# Patient Record
Sex: MALE | Race: WHITE | Employment: OTHER | URBAN - NONMETROPOLITAN AREA
[De-identification: names, ages, dates, MRNs, and addresses within clinical notes are randomized per-mention and may not be internally consistent; named-entity substitution may affect disease eponyms.]

---

## 2019-10-08 ENCOUNTER — LABORATORY ENCOUNTER (OUTPATIENT)
Dept: LAB | Age: 59
End: 2019-10-08
Attending: FAMILY MEDICINE
Payer: COMMERCIAL

## 2019-10-08 ENCOUNTER — OFFICE VISIT (OUTPATIENT)
Dept: FAMILY MEDICINE CLINIC | Facility: CLINIC | Age: 59
End: 2019-10-08
Payer: COMMERCIAL

## 2019-10-08 VITALS
WEIGHT: 229 LBS | HEART RATE: 80 BPM | HEIGHT: 70.5 IN | BODY MASS INDEX: 32.42 KG/M2 | DIASTOLIC BLOOD PRESSURE: 84 MMHG | TEMPERATURE: 98 F | RESPIRATION RATE: 12 BRPM | SYSTOLIC BLOOD PRESSURE: 138 MMHG

## 2019-10-08 DIAGNOSIS — Z12.11 SCREENING FOR COLON CANCER: ICD-10-CM

## 2019-10-08 DIAGNOSIS — Z13.220 SCREENING, LIPID: Primary | ICD-10-CM

## 2019-10-08 DIAGNOSIS — Z13.220 SCREENING, LIPID: ICD-10-CM

## 2019-10-08 DIAGNOSIS — Z00.00 WELL ADULT EXAM: Primary | ICD-10-CM

## 2019-10-08 DIAGNOSIS — Z13.1 SCREENING FOR DIABETES MELLITUS: ICD-10-CM

## 2019-10-08 DIAGNOSIS — Z00.00 LABORATORY EXAMINATION ORDERED AS PART OF A ROUTINE GENERAL MEDICAL EXAMINATION: ICD-10-CM

## 2019-10-08 DIAGNOSIS — Z13.0 SCREENING, ANEMIA, DEFICIENCY, IRON: ICD-10-CM

## 2019-10-08 DIAGNOSIS — Z12.5 SCREENING PSA (PROSTATE SPECIFIC ANTIGEN): ICD-10-CM

## 2019-10-08 DIAGNOSIS — Z23 NEED FOR DIPHTHERIA-TETANUS-PERTUSSIS (TDAP) VACCINE: ICD-10-CM

## 2019-10-08 DIAGNOSIS — Z28.21 REFUSED INFLUENZA VACCINE: ICD-10-CM

## 2019-10-08 PROCEDURE — 80053 COMPREHEN METABOLIC PANEL: CPT

## 2019-10-08 PROCEDURE — 36415 COLL VENOUS BLD VENIPUNCTURE: CPT

## 2019-10-08 PROCEDURE — 99386 PREV VISIT NEW AGE 40-64: CPT | Performed by: FAMILY MEDICINE

## 2019-10-08 PROCEDURE — 90471 IMMUNIZATION ADMIN: CPT | Performed by: FAMILY MEDICINE

## 2019-10-08 PROCEDURE — 90715 TDAP VACCINE 7 YRS/> IM: CPT | Performed by: FAMILY MEDICINE

## 2019-10-08 PROCEDURE — 85025 COMPLETE CBC W/AUTO DIFF WBC: CPT

## 2019-10-08 PROCEDURE — 80061 LIPID PANEL: CPT

## 2019-10-08 NOTE — PROGRESS NOTES
Aylin Abdi is a 63year old male. CC:  Patient presents with:  CPX: inrm.  5    Chief Complaint Reviewed and Verified  Nursing Notes Reviewed and   Verified  Tobacco Reviewed  Allergies Reviewed  Medications Reviewed    Problem List Reviewed  Medi stream  MUSCULOSKELETAL: denies back pain  See HPI regarding knuckles  NEURO: denies headaches  PSYCHE: denies depression or anxiety       Objective   EXAM:   Blood pressure 138/84, pulse 80, temperature 97.7 °F (36.5 °C), temperature source Temporal, resp Imaging & Consults:  GASTRO - INTERNAL  TETANUS, DIPHTHERIA TOXOIDS AND ACELLULAR PERTUSIS VACCINE (TDAP), >7 YEARS, IM USE    Return in about 1 year (around 10/8/2020) for wellness exam.    Naeem Hopkins M.D., FAAFP

## 2019-10-09 ENCOUNTER — PATIENT MESSAGE (OUTPATIENT)
Dept: FAMILY MEDICINE CLINIC | Facility: CLINIC | Age: 59
End: 2019-10-09

## 2019-10-09 NOTE — PROGRESS NOTES
Notify PSA normal. Recheck in 3 years--low risk  The cbc is normal  No anemia  The chem is normal -so no diabetes  And kidney liver numbers normal    The lipid is too high  And he should consider medications like atorvastatin 10 mg a day  But he CAN try lo

## 2019-10-14 NOTE — TELEPHONE ENCOUNTER
From: Angela Fierro  To: Bessy Barroso  Sent: 10/9/2019 9:40 AM CDT  Subject: lab results    George Hill,     I see that you saw your lab results online. Do you have any questions? ? Inpatient Consult to General Surgery  Consult performed by: RODNEY TREVINO  Consult ordered by: CLARA POSADAS  Reason for consult: left ankle abscess and cellulitis  Assessment/Recommendations: OR for incision and drainage          Patient Care Team:  Clara Posadas MD as PCP - General  Clara Posadas MD as PCP - Family Medicine    Chief complaint: Left foot swelling/cellulitis    Subjective     HPI Comments: Patient with diabetes.  No crepitance or signs of osteomyelitis on imaging.  Lactic acidosis present    Foot Problem   This is a new problem. The current episode started in the past 7 days. The problem occurs constantly. The problem has been gradually worsening. Pertinent negatives include no abdominal pain, chest pain, chills, coughing, fever, headaches, joint swelling, nausea, rash, sore throat, vomiting or weakness. Nothing aggravates the symptoms.       Review of Systems   Constitutional: Negative for activity change, appetite change, chills and fever.   HENT: Negative for sore throat and trouble swallowing.    Eyes: Negative for visual disturbance.   Respiratory: Negative for cough and shortness of breath.    Cardiovascular: Negative for chest pain and palpitations.   Gastrointestinal: Negative for abdominal distention, abdominal pain, blood in stool, constipation, diarrhea, nausea and vomiting.   Endocrine: Negative for cold intolerance and heat intolerance.   Genitourinary: Negative for dysuria.   Musculoskeletal: Negative for joint swelling.   Skin: Positive for wound. Negative for color change and rash.   Allergic/Immunologic: Negative for immunocompromised state.   Neurological: Negative for dizziness, seizures, weakness and headaches.   Hematological: Negative for adenopathy. Does not bruise/bleed easily.   Psychiatric/Behavioral: Negative for agitation and confusion.        Past Medical History   Diagnosis Date   • Alcohol abuse    • Anxiety    • Depression    • Diabetes    •  GERD (gastroesophageal reflux disease)    • Hepatic encephalopathy    • Hypertension    • Liver disease    • Neuropathic pain    • Withdrawal symptoms, alcohol    ,   Past Surgical History   Procedure Laterality Date   • Knee surgery Right    ,   Family History   Problem Relation Age of Onset   • Family history unknown: Yes   ,   Social History   Substance Use Topics   • Smoking status: Current Every Day Smoker     Packs/day: 1.00     Years: 40.00     Types: Cigarettes   • Smokeless tobacco: Never Used      Comment: declines   • Alcohol use Yes      Comment: patient says he started drinking at age 13. drank heavy for several years starting about 45 years ago. has been drinking more which is now  up to a  5th a day this time for about 7 months.    ,   Prescriptions Prior to Admission   Medication Sig Dispense Refill Last Dose   • ALPRAZolam (XANAX) 1 MG tablet Take 1 mg by mouth 2 (Two) Times a Day As Needed for anxiety.   1/18/2017 at 0500   • citalopram (CeleXA) 20 MG tablet Take 20 mg by mouth daily.   1/18/2017 at 0500   • DULoxetine (CYMBALTA) 60 MG capsule Take 60 mg by mouth daily.   1/18/2017 at 0500   • gabapentin (NEURONTIN) 800 MG tablet Take 800 mg by mouth 4 (four) times a day.   1/18/2017 at 0500   • glipiZIDE (GLUCOTROL) 5 MG tablet Take 5 mg by mouth 2 (two) times a day before meals.   1/18/2017 at 0500   • pantoprazole (PROTONIX) 40 MG EC tablet Take 40 mg by mouth 2 (two) times a day.   1/18/2017 at 0500   • potassium chloride (MICRO-K) 10 MEQ CR capsule Take 10 mEq by mouth daily.   1/18/2017 at 0500   • spironolactone (ALDACTONE) 50 MG tablet Take 25 mg by mouth Daily. Take 1/2 tablet (25mg) daily   1/18/2017 at 0500   • traMADol (ULTRAM) 50 MG tablet Take 50 mg by mouth 3 (Three) Times a Day As Needed for moderate pain (4-6).   1/18/2017 at 0500    and Allergies:  Sulfa antibiotics    Objective      Vital Signs  Temp:  [97.5 °F (36.4 °C)-98.5 °F (36.9 °C)] 98.5 °F (36.9 °C)  Heart Rate:  []  107  Resp:  [18] 18  BP: (132-155)/(72-99) 135/72    Physical Exam   Constitutional: He is oriented to person, place, and time. He appears well-developed.   HENT:   Head: Normocephalic and atraumatic.   Mouth/Throat: Mucous membranes are normal.   Eyes: Conjunctivae are normal. Pupils are equal, round, and reactive to light.   Neck: Neck supple. No JVD present. No tracheal deviation present. No thyromegaly present.   Cardiovascular: Normal rate and regular rhythm.  Exam reveals no gallop and no friction rub.    No murmur heard.  Pulmonary/Chest: Effort normal and breath sounds normal.   Abdominal: Soft. He exhibits no distension. There is no splenomegaly or hepatomegaly. There is no tenderness. No hernia.   Musculoskeletal: Normal range of motion. He exhibits no deformity.   Left lateral ankle with abscess and surrounding cellulitis and edema involving the foot up to the mid calf.  Palpable pedal pulses.   Neurological: He is alert and oriented to person, place, and time.   Skin: Skin is warm and dry.   Psychiatric: He has a normal mood and affect.       Results Review:    I reviewed the patient's new clinical results.        Assessment/Plan     Active Problems:    Cellulitis of left lower extremity      Assessment:  (Diabetes  Cellulitis  Lactic acidosis  L ankle skin abscess).     Plan:   (Broad spectrum antibiotics  NPO  OR for incision and drainage.).       I discussed the patients findings and my recommendations with patient    Manpreet Garcia MD  01/19/17  9:44 AM

## 2023-11-22 ENCOUNTER — PATIENT OUTREACH (OUTPATIENT)
Dept: CASE MANAGEMENT | Age: 63
End: 2023-11-22

## 2023-11-22 NOTE — PROCEDURES
The office order for PCP removal request is Approved and finalized on November 22, 2023.     Thanks,  [facility] [employer]